# Patient Record
Sex: MALE | Race: WHITE | NOT HISPANIC OR LATINO | Employment: UNEMPLOYED
[De-identification: names, ages, dates, MRNs, and addresses within clinical notes are randomized per-mention and may not be internally consistent; named-entity substitution may affect disease eponyms.]

---

## 2019-10-02 ENCOUNTER — HEALTH MAINTENANCE LETTER (OUTPATIENT)
Age: 39
End: 2019-10-02

## 2019-10-30 ENCOUNTER — HEALTH MAINTENANCE LETTER (OUTPATIENT)
Age: 39
End: 2019-10-30

## 2021-01-15 ENCOUNTER — HEALTH MAINTENANCE LETTER (OUTPATIENT)
Age: 41
End: 2021-01-15

## 2021-09-04 ENCOUNTER — HEALTH MAINTENANCE LETTER (OUTPATIENT)
Age: 41
End: 2021-09-04

## 2022-02-19 ENCOUNTER — HEALTH MAINTENANCE LETTER (OUTPATIENT)
Age: 42
End: 2022-02-19

## 2022-10-22 ENCOUNTER — HEALTH MAINTENANCE LETTER (OUTPATIENT)
Age: 42
End: 2022-10-22

## 2022-11-16 ENCOUNTER — NURSE TRIAGE (OUTPATIENT)
Dept: NURSING | Facility: CLINIC | Age: 42
End: 2022-11-16

## 2022-11-17 ENCOUNTER — HOSPITAL ENCOUNTER (EMERGENCY)
Facility: CLINIC | Age: 42
Discharge: HOME | End: 2022-11-17
Payer: COMMERCIAL

## 2022-11-17 VITALS
RESPIRATION RATE: 18 BRPM | DIASTOLIC BLOOD PRESSURE: 80 MMHG | BODY MASS INDEX: 20.1 KG/M2 | TEMPERATURE: 98.4 F | WEIGHT: 148.4 LBS | HEART RATE: 100 BPM | SYSTOLIC BLOOD PRESSURE: 135 MMHG | OXYGEN SATURATION: 98 % | HEIGHT: 72 IN

## 2022-11-17 NOTE — ED NOTES
Patient can no longer wait.  He and his family member said they will go to another ED. Patient did not sign the Declination of Medical Screening.

## 2022-11-17 NOTE — TELEPHONE ENCOUNTER
"Triage Call:    Patient's friend calling to report patient has foreign body stuck in nose.  Verbal consent to communicate given by patient.  Patient's nose was leaking, he \"balled up tissue and placed in his nostril\", \"he has done in the past and usually just blows it out\".  Patient was treated in ED related to \"ear issues\" due to decreased hearing in left ear, patient has fluid in left ear.  Patient has tried saline spray, blowing out hard, decongestants, and nasal lavage without improvement.    According to the protocol, patient should go to ED now.  Care advice given. Patient verbalizes understanding and agrees with plan of care.     Carol Puente RN  11/16/22 11:10 PM  [facility] Nurse Advisor      Reason for Disposition    [1] Caller unable to remove FB AND [2] not removed after using CARE ADVICE    Additional Information    Negative: SEVERE difficulty breathing (e.g., struggling for each breath, speaks in single words, stridor)    Negative: Sounds like a life-threatening emergency to the triager    Negative: Sharp FB    Negative: Button battery FB    Negative: Bleeding from nose    Negative: Severe nose pain    Protocols used: NOSE - FOREIGN BODY-A-AH      "

## 2022-11-17 NOTE — ED TRIAGE NOTES
Patient said he put tissue inside his nose 8 days ago. He said he pulled it out but part of it was stocked inside. 4 days ago his right ear starting to hurt.  He  Said it feels like his head was going to explode.      Triage Assessment     Row Name 11/17/22 0056       Triage Assessment (Adult)    Airway WDL X  pt reported a tissue stocked inside his nose       Respiratory WDL    Respiratory WDL X  congested       Skin Circulation/Temperature WDL    Skin Circulation/Temperature WDL WDL       Cardiac WDL    Cardiac WDL WDL       Peripheral/Neurovascular WDL    Peripheral Neurovascular WDL WDL       Cognitive/Neuro/Behavioral WDL    Cognitive/Neuro/Behavioral WDL WDL

## 2023-04-01 ENCOUNTER — HEALTH MAINTENANCE LETTER (OUTPATIENT)
Age: 43
End: 2023-04-01

## 2024-06-01 ENCOUNTER — HEALTH MAINTENANCE LETTER (OUTPATIENT)
Age: 44
End: 2024-06-01

## 2025-01-10 ENCOUNTER — HOSPITAL ENCOUNTER (EMERGENCY)
Facility: CLINIC | Age: 45
Discharge: HOME | End: 2025-01-10
Attending: EMERGENCY MEDICINE | Admitting: EMERGENCY MEDICINE
Payer: COMMERCIAL

## 2025-01-10 VITALS
TEMPERATURE: 98.7 F | WEIGHT: 182.4 LBS | DIASTOLIC BLOOD PRESSURE: 85 MMHG | OXYGEN SATURATION: 97 % | RESPIRATION RATE: 18 BRPM | HEART RATE: 93 BPM | SYSTOLIC BLOOD PRESSURE: 123 MMHG | HEIGHT: 72 IN | BODY MASS INDEX: 24.71 KG/M2

## 2025-01-10 DIAGNOSIS — M79.671 RIGHT FOOT PAIN: ICD-10-CM

## 2025-01-10 DIAGNOSIS — T33.90XA: ICD-10-CM

## 2025-01-10 PROCEDURE — 99283 EMERGENCY DEPT VISIT LOW MDM: CPT | Mod: GC | Performed by: EMERGENCY MEDICINE

## 2025-01-10 PROCEDURE — 99283 EMERGENCY DEPT VISIT LOW MDM: CPT | Performed by: EMERGENCY MEDICINE

## 2025-01-10 ASSESSMENT — ACTIVITIES OF DAILY LIVING (ADL)
ADLS_ACUITY_SCORE: 41
ADLS_ACUITY_SCORE: 41

## 2025-01-10 ASSESSMENT — COLUMBIA-SUICIDE SEVERITY RATING SCALE - C-SSRS
2. HAVE YOU ACTUALLY HAD ANY THOUGHTS OF KILLING YOURSELF IN THE PAST MONTH?: NO
6. HAVE YOU EVER DONE ANYTHING, STARTED TO DO ANYTHING, OR PREPARED TO DO ANYTHING TO END YOUR LIFE?: NO
1. IN THE PAST MONTH, HAVE YOU WISHED YOU WERE DEAD OR WISHED YOU COULD GO TO SLEEP AND NOT WAKE UP?: NO

## 2025-01-10 NOTE — ED TRIAGE NOTES
Pt states the right foot hurts worse.      Triage Assessment (Adult)       Row Name 01/10/25 0726          Triage Assessment    Airway WDL WDL        Respiratory WDL    Respiratory WDL WDL        Skin Circulation/Temperature WDL    Skin Circulation/Temperature WDL WDL        Cardiac WDL    Cardiac WDL WDL        Peripheral/Neurovascular WDL    Peripheral Neurovascular WDL WDL        Cognitive/Neuro/Behavioral WDL    Cognitive/Neuro/Behavioral WDL WDL

## 2025-01-10 NOTE — DISCHARGE INSTRUCTIONS
It is most important that the patient should keep feet dry and replace socks when become soaked. Keep warm as best as possible. No follow up necessary.
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
## 2025-04-19 ENCOUNTER — APPOINTMENT (OUTPATIENT)
Dept: ULTRASOUND IMAGING | Facility: CLINIC | Age: 45
End: 2025-04-19
Attending: EMERGENCY MEDICINE
Payer: COMMERCIAL

## 2025-04-19 ENCOUNTER — HOSPITAL ENCOUNTER (EMERGENCY)
Facility: CLINIC | Age: 45
Discharge: HOME | End: 2025-04-19
Attending: EMERGENCY MEDICINE | Admitting: EMERGENCY MEDICINE
Payer: COMMERCIAL

## 2025-04-19 VITALS
HEART RATE: 83 BPM | RESPIRATION RATE: 18 BRPM | WEIGHT: 179 LBS | DIASTOLIC BLOOD PRESSURE: 101 MMHG | SYSTOLIC BLOOD PRESSURE: 138 MMHG | BODY MASS INDEX: 24.24 KG/M2 | HEIGHT: 72 IN | OXYGEN SATURATION: 100 % | TEMPERATURE: 97.9 F

## 2025-04-19 DIAGNOSIS — M79.604 RIGHT LEG PAIN: ICD-10-CM

## 2025-04-19 PROCEDURE — 93971 EXTREMITY STUDY: CPT | Mod: RT

## 2025-04-19 PROCEDURE — 99284 EMERGENCY DEPT VISIT MOD MDM: CPT | Mod: 25 | Performed by: EMERGENCY MEDICINE

## 2025-04-19 PROCEDURE — 99283 EMERGENCY DEPT VISIT LOW MDM: CPT | Performed by: EMERGENCY MEDICINE

## 2025-04-19 ASSESSMENT — COLUMBIA-SUICIDE SEVERITY RATING SCALE - C-SSRS
1. IN THE PAST MONTH, HAVE YOU WISHED YOU WERE DEAD OR WISHED YOU COULD GO TO SLEEP AND NOT WAKE UP?: NO
2. HAVE YOU ACTUALLY HAD ANY THOUGHTS OF KILLING YOURSELF IN THE PAST MONTH?: NO
6. HAVE YOU EVER DONE ANYTHING, STARTED TO DO ANYTHING, OR PREPARED TO DO ANYTHING TO END YOUR LIFE?: NO
2. HAVE YOU ACTUALLY HAD ANY THOUGHTS OF KILLING YOURSELF IN THE PAST MONTH?: NO
6. HAVE YOU EVER DONE ANYTHING, STARTED TO DO ANYTHING, OR PREPARED TO DO ANYTHING TO END YOUR LIFE?: NO
1. IN THE PAST MONTH, HAVE YOU WISHED YOU WERE DEAD OR WISHED YOU COULD GO TO SLEEP AND NOT WAKE UP?: NO

## 2025-04-19 ASSESSMENT — ACTIVITIES OF DAILY LIVING (ADL): ADLS_ACUITY_SCORE: 41

## 2025-04-19 NOTE — ED PROVIDER NOTES
"ED Provider Note  [facility] EMERGENCY DEPARTMENT ([facility])    4/19/25       ED PROVIDER NOTE     History     Chief Complaint   Patient presents with    Leg Swelling     Right leg swelling. \"Has been present for while\" per pt.     HPI  Edy Jolly is a 45 year old male with a notable history of PTSD and homelessness who presents to the ED for evaluation of right leg swelling. Patient reports that this right leg swelling started months ago. Recently, his leg started to hurt primarily in his calf. Patient also has a rash that he states has been there for a while. Denies any recent falls or injuries. Denies recent travel. Patient also denies ankle or knee pain.       Past Medical History  No past medical history on file.  Past Surgical History:   Procedure Laterality Date    HEAD & NECK SURGERY  1979    Soft spots closed early and my head was opened in surgery     amphetamine-dextroamphetamine (ADDERALL) 15 MG tablet  amphetamine-dextroamphetamine (ADDERALL) 15 MG tablet  amphetamine-dextroamphetamine (ADDERALL) 15 MG tablet  Buprenorphine HCl-Naloxone HCl (SUBOXONE SL)  ibuprofen (ADVIL,MOTRIN) 800 MG tablet  valACYclovir (VALTREX) 1000 mg tablet      No Known Allergies  Family History  Family History   Problem Relation Age of Onset    Cancer - colorectal No family hx of     Prostate Cancer No family hx of     Diabetes No family hx of     Hypertension No family hx of     Cerebrovascular Disease No family hx of     C.A.D. No family hx of      Social History   Social History     Tobacco Use    Smoking status: Every Day     Types: Cigarettes, Cigars    Smokeless tobacco: Never   Substance Use Topics    Alcohol use: Never     Comment: none since Feb 2011    Drug use: No     Comment: history of opiate abuse, sober since March 2013      A medically appropriate review of systems was performed with pertinent positives and negatives noted in the HPI, and all other systems " negative.    Physical Exam      Physical Exam  Physical Exam   Constitutional: oriented to person, place, and time. appears well-developed and well-nourished.   HENT:   Head: Normocephalic and atraumatic.   Neck: Normal range of motion.   Pulmonary/Chest: Effort normal. No respiratory distress.   Cardiac: No murmurs, rubs, gallops. RRR.  Abdominal: Abdomen soft, nontender, nondistended. No rebound tenderness.  MSK: Long bones without deformity or evidence of trauma findings consistent with psoriasis on the lower legs.  Neurological: alert and oriented to person, place, and time.   Skin: Skin is warm and dry.   Psychiatric:  normal mood and affect.  behavior is normal. Thought content normal.   .  Bilateral lower extremities without edema.  There are some tenderness over the right calf without a palpable cord.  There is some chronic    ED Course, Procedures, & Data      Procedures            Results for orders placed or performed during the hospital encounter of 04/19/25   US Lower Extremity Venous Duplex Right     Status: None    Narrative    EXAM: US LOWER EXTREMITY VENOUS DUPLEX RIGHT  LOCATION: [facility]  DATE: 4/19/2025    INDICATION: pain in calf mild edema  COMPARISON: None.  TECHNIQUE: Venous Duplex ultrasound of the right lower extremity with and without compression, augmentation and duplex. Color flow and spectral Doppler with waveform analysis performed.    FINDINGS: Exam includes the common femoral, femoral, popliteal, and contralateral common femoral veins as well as segmentally visualized deep calf veins and greater saphenous vein.     RIGHT: No deep vein thrombosis. No superficial thrombophlebitis. No popliteal cyst.      Impression    IMPRESSION:  1.  No deep venous thrombosis in the right lower extremity.     Medications - No data to display  Labs Ordered and Resulted from Time of ED Arrival to Time of ED Departure - No data to display  No orders to  display          Critical care was not performed.     Medical Decision Making  The patient's presentation was of moderate complexity (an undiagnosed new problem with uncertain prognosis).    The patient's evaluation involved:  ordering and/or review of 1 test(s) in this encounter (see separate area of note for details)    The patient's management necessitated only low risk treatment.    Assessment & Plan    Patient with leg pain.  There is no signs of infection or septic arthritis of the right lower leg.  There is no DVT on ultrasound.  There is no significant appreciable swelling.  Likely muscle strain.  No trauma, very unlikely fracture, will defer x-rays.  Discussed follow-up with primary care provider return if worsening.    I have reviewed the nursing notes. I have reviewed the findings, diagnosis, plan and need for follow up with the patient.    New Prescriptions    No medications on file       Final diagnoses:   Right leg pain   INikita, am serving as a trained medical scribe to document services personally performed by Conor Laughlin MD, based on the provider's statements to me.     IConor MD, was physically present and have reviewed and verified the accuracy of this note documented by Nikita Cameron.     Conor Laughlin MD  [facility] EMERGENCY DEPARTMENT  4/19/2025     Conor Laughlin MD  04/19/25 0248

## 2025-04-19 NOTE — ED TRIAGE NOTES
Triage Assessment (Adult)       Row Name 04/19/25 0131          Triage Assessment    Airway WDL WDL        Respiratory WDL    Respiratory WDL WDL

## 2025-04-19 NOTE — ED NOTES
Pt discharged to home by self instructions and education for home and follow-up care provided; Pt verbalized understanding. All questions and concerns addressed prior to discharge.   
19.68

## 2025-04-19 NOTE — DISCHARGE INSTRUCTIONS
Return to the emergency department if you develop worsening symptoms or if you have any further concerns otherwise follow-up with your primary care provider if you have ongoing symptoms over the next week    If Edy has discomfort from fever or other pain, he can have:  Acetaminophen (Tylenol) every 6 hours as needed. His dose is:    2 regular strength tabs (650 mg)                                     (43.2+ kg/96+ lb)    NOTE: If your acetaminophen (Tylenol) came with a dropper marked with 0.4 and 0.8 ml, call us ([phone]) or check with your doctor about the dose before using it.     AND/OR      Ibuprofen (Advil, Motrin) every 6 hours as needed. His/her dose is:    2 regular strength tabs (400 mg)                                                                         (40-60 kg/ lb)

## 2025-06-14 ENCOUNTER — HEALTH MAINTENANCE LETTER (OUTPATIENT)
Age: 45
End: 2025-06-14